# Patient Record
Sex: MALE | Race: AMERICAN INDIAN OR ALASKA NATIVE | URBAN - METROPOLITAN AREA
[De-identification: names, ages, dates, MRNs, and addresses within clinical notes are randomized per-mention and may not be internally consistent; named-entity substitution may affect disease eponyms.]

---

## 2022-12-28 ENCOUNTER — EMERGENCY (EMERGENCY)
Facility: HOSPITAL | Age: 42
LOS: 0 days | Discharge: HOME | End: 2022-12-28
Attending: EMERGENCY MEDICINE | Admitting: EMERGENCY MEDICINE
Payer: MEDICAID

## 2022-12-28 VITALS
RESPIRATION RATE: 20 BRPM | SYSTOLIC BLOOD PRESSURE: 171 MMHG | DIASTOLIC BLOOD PRESSURE: 99 MMHG | OXYGEN SATURATION: 100 % | HEART RATE: 87 BPM | TEMPERATURE: 96 F

## 2022-12-28 DIAGNOSIS — K08.89 OTHER SPECIFIED DISORDERS OF TEETH AND SUPPORTING STRUCTURES: ICD-10-CM

## 2022-12-28 DIAGNOSIS — K02.9 DENTAL CARIES, UNSPECIFIED: ICD-10-CM

## 2022-12-28 PROCEDURE — 99282 EMERGENCY DEPT VISIT SF MDM: CPT

## 2022-12-28 NOTE — CONSULT NOTE ADULT - ASSESSMENT
Patient is a 42y old  Male who presents with a chief complaint of lower right dental pain    HPI: 42 y M complains lower right dental pain that started last night. Trouble sleeping and eating. Denies difficult swallowing and breathing       PAST MEDICAL & SURGICAL HISTORY:    ( -  ) heart valve replacement  ( -  ) joint replacement  ( -  ) pregnancy    MEDICATIONS  (STANDING):    MEDICATIONS  (PRN):      Allergies      Intolerances        FAMILY HISTORY:        *Last Dental Visit:    Vital Signs Last 24 Hrs  T(C): 35.6 (28 Dec 2022 12:42), Max: 35.6 (28 Dec 2022 12:42)  T(F): 96 (28 Dec 2022 12:42), Max: 96 (28 Dec 2022 12:42)  HR: 87 (28 Dec 2022 12:42) (87 - 87)  BP: 171/99 (28 Dec 2022 12:42) (171/99 - 171/99)  BP(mean): --  RR: 20 (28 Dec 2022 12:42) (20 - 20)  SpO2: 100% (28 Dec 2022 12:42) (100% - 100%)    Parameters below as of 28 Dec 2022 12:42  Patient On (Oxygen Delivery Method): room air        LABS:                  EOE:  TMJ (  - ) clicks                     ( -  ) pops                     (  - ) crepitus             Mandible <<FROM>>             Facial bones and MOM <<grossly intact>>             (  - ) trismus             (-   ) lymphadenopathy             ( -  ) swelling             (  - ) asymmetry             ( -  ) palpation             (  - ) dyspnea             (  - ) dysphagia             (  - ) loss of consciousness    IOE:  <<permanent>> dentition: <<multiple carious teeth>>           hard/soft palate:  (   -) palatal torus, <<No pathology noted>>           tongue/FOM <<No pathology noted>>           labial/buccal mucosa <<No pathology noted>>           ( +  ) percussion           (  + ) palpation           ( -  ) swelling            (  - ) abscess           (  - ) sinus tract    Dentition present: <<YES   >>  Mobility: << NO >>  Caries: << YES #31 DO   >>         *DENTAL RADIOGRAPHS: 1 Periapical     RADIOLOGY & ADDITIONAL STUDIES: #31 DO caries down to pulp    *ASSESSMENT: Upon clinical exam, #31 has DO caries. Distal is covered by gingival tissue. Pain to percussion and palpation. Possible root canal therapy after excavation of #31 or extraction #31. Patient explained risks and benefits of #31. Patient prescribed Amoxicillin 500 mg and Ibuprofen 600 mg for infection/pain control. Patient stated he will follow up private dentist.        *PLAN: Patient prescribed Amoxicillin 500 mg and Ibuprofen 600 mg for infection/pain control. Follow up private dentist      PROCEDURE:   Verbal and written consent given.  Anesthesia: <<  NONE  >>   Treatment: << Emergency exam done. Amoxicillin 500 mg and Ibuprofen 600 mg for infection/pain control. Follow up private dentist     >>     RECOMMENDATIONS:  1) <<Amoxicillin 500 mg and Ibuprofen 600 mg for infection/pain control. Follow up private dentist   >>  2) Dental F/U with outpatient dentist for comprehensive dental care.   3) If any difficulty swallowing/breathing, fever occur, return to ER.     Resident Name, pager # Vilma Field DDS [phone]

## 2022-12-28 NOTE — ED ADULT NURSE NOTE - NSIMPLEMENTINTERV_GEN_ALL_ED
Implemented All Universal Safety Interventions:  [place] to call system. Call bell, personal items and telephone within reach. Instruct patient to call for assistance. Room bathroom lighting operational. Non-slip footwear when patient is off stretcher. Physically safe environment: no spills, clutter or unnecessary equipment. Stretcher in lowest position, wheels locked, appropriate side rails in place.

## 2022-12-28 NOTE — ED PROVIDER NOTE - ATTENDING APP SHARED VISIT CONTRIBUTION OF CARE
41 yo m presents with toothache.  Tender to percussion tooth# 31   .  No abscess.  [facility] dental clinic

## 2022-12-28 NOTE — ED PROVIDER NOTE - PHYSICAL EXAMINATION
CONSTITUTIONAL: Well-appearing; well-nourished; in no apparent distress.   ENT: normal nose; no rhinorrhea; normal pharynx with no tonsillar hypertrophy. poor dentition/caries  NECK: Supple; non-tender; no cervical lymphadenopathy.  CARDIOVASCULAR: Normal S1, S2; no murmurs, rubs, or gallops.   RESPIRATORY: Normal chest excursion with respiration  GI/: Non-distended  MS: No evidence of trauma or deformity.   SKIN: Normal for age and race; warm; dry; good turgor; no apparent lesions or exudate.   NEURO/PSYCH: A & O x 4; grossly unremarkable. mood and manner are appropriate.

## 2022-12-28 NOTE — ED PROVIDER NOTE - CLINICAL SUMMARY MEDICAL DECISION MAKING FREE TEXT BOX
43 yo m presents with toothache.  Tender to percussion tooth# 31   .  No abscess.  [facility] dental clinic

## 2022-12-28 NOTE — ED PROVIDER NOTE - OBJECTIVE STATEMENT
R lower dental pain pt presents to ED c/o R lower dental pain. pain is sharp, nonradiating, moderate  denies exacerbating or relieving factors  Denies fever/chill/HA/dizziness/chest pain/palpitation/sob/abd pain/n/v/d/ black stool/bloody stool/urinary sxs

## 2022-12-28 NOTE — ED PROVIDER NOTE - NS ED ATTENDING STATEMENT MOD
This was a shared visit with the [id number]. I reviewed and verified the documentation and independently performed the documented:

## 2023-09-21 ENCOUNTER — NON-APPOINTMENT (OUTPATIENT)
Age: 43
End: 2023-09-21

## 2023-12-11 ENCOUNTER — NON-APPOINTMENT (OUTPATIENT)
Age: 43
End: 2023-12-11

## 2024-06-26 ENCOUNTER — NON-APPOINTMENT (OUTPATIENT)
Age: 44
End: 2024-06-26

## 2024-10-21 ENCOUNTER — OUTPATIENT (OUTPATIENT)
Dept: OUTPATIENT SERVICES | Facility: HOSPITAL | Age: 44
LOS: 1 days | End: 2024-10-21
Payer: MEDICAID

## 2024-10-21 DIAGNOSIS — R79.89 OTHER SPECIFIED ABNORMAL FINDINGS OF BLOOD CHEMISTRY: ICD-10-CM

## 2024-10-21 DIAGNOSIS — Z00.8 ENCOUNTER FOR OTHER GENERAL EXAMINATION: ICD-10-CM

## 2024-10-21 PROCEDURE — 76705 ECHO EXAM OF ABDOMEN: CPT | Mod: 26

## 2024-10-21 PROCEDURE — 76705 ECHO EXAM OF ABDOMEN: CPT

## 2024-10-21 PROCEDURE — 76981 USE PARENCHYMA: CPT

## 2024-10-21 PROCEDURE — 76981 USE PARENCHYMA: CPT | Mod: 26

## 2024-10-22 DIAGNOSIS — R79.89 OTHER SPECIFIED ABNORMAL FINDINGS OF BLOOD CHEMISTRY: ICD-10-CM

## 2025-08-16 ENCOUNTER — NON-APPOINTMENT (OUTPATIENT)
Age: 45
End: 2025-08-16